# Patient Record
(demographics unavailable — no encounter records)

---

## 2025-03-21 NOTE — PHYSICAL EXAM
[Chaperone Present] : A chaperone was present in the examining room during all aspects of the physical examination [Appropriately responsive] : appropriately responsive [Alert] : alert [No Acute Distress] : no acute distress [No Lymphadenopathy] : no lymphadenopathy [Regular Rate Rhythm] : regular rate rhythm [No Murmurs] : no murmurs [Clear to Auscultation B/L] : clear to auscultation bilaterally [Soft] : soft [Non-tender] : non-tender [Non-distended] : non-distended [No HSM] : No HSM [No Lesions] : no lesions [No Mass] : no mass [Oriented x3] : oriented x3 [Examination Of The Breasts] : a normal appearance [No Masses] : no breast masses were palpable [Labia Majora] : normal [Labia Minora] : normal [Normal] : normal [Anteversion] : anteverted [Uterine Adnexae] : normal [FreeTextEntry2] : Isabella [FreeTextEntry6] : No masses, nontender, no skin changes, no nipple discharge, no adenopathy. [Tenderness] : nontender [Mass ___ cm] : no uterine mass was palpated

## 2025-03-21 NOTE — HISTORY OF PRESENT ILLNESS
[FreeTextEntry1] : Patient is a 24-year-old  0 last menstrual period 2025 Presents for initial visit, to establish GYN care, annual visit, complaining of some heavier and more painful periods Patient has ParaGard IUD in place since 2024

## 2025-03-21 NOTE — PLAN
[FreeTextEntry1] : Patient is a 24-year-old  0 last menstrual period 2025 Patient presents for initial visit, to establish GYN care, annual visit, complaining of some heavy and more painful periods Patient has ParaGard IUD in place since 2024 Physical exam reveals a well-developed well-nourished female no apparent distress,, BMI 29 Heart regular rhythm and rate, lungs clear, breast no mass nontender no skin change no nipple discharge no adenopathy, abdomen soft nontender no organomegaly Pelvic exam shows normal female external genitalia, vagina lesions, cervix appropriate size nontender, uterus anteverted normal size nontender, adnexa no mass nontender IUD string visible at the external cervical os Patient states she had an IUD performed with negative findings on 2025,, will forward the results Discussed at length that IUD may unfortunately cause increased bleeding with her cycle and also some increased pain but this should resolve in time Questions answered Patient states she understands Follow-up 1 year or prior to that as needed  Isabella was present as a chaperone for the entire assessment and examination of this patient  Past medical history,, history of migraines with auras and tachycardia PSHx,, patient denies Allergies,, Compass denies Medications,, Zyrtec, Norvasc Past OB history,, patient denies Past GYN history,,, medication 12, interval 28, duration 5 days, patient states she had been on birth controls approximately 5 to 6 years in the past and states she has an IUD in place the ParaGard since 2024 Tobacco use,,, vapes Alcoholic,,, social use Drug use,, occasional marijuana use Family history,, mother alive history of breast cancer at age 16,, hypertension,, father alive history of heart disease,, denies any other family history of breast or ovarian cancer

## 2025-04-07 NOTE — ASSESSMENT
[FreeTextEntry1] : 24-year-old woman with episodic chest pain, palpitations, and shortness of breath.

## 2025-04-07 NOTE — REASON FOR VISIT
[FreeTextEntry1] : 24-year-old woman with history of migraines, ADHD, insomnia comes in for evaluation of chest pains, shortness of breath, palpitations.

## 2025-04-07 NOTE — HISTORY OF PRESENT ILLNESS
[FreeTextEntry1] : 24-year-old woman presents for evaluation of chest pain, shortness of breath, and palpitations.  She states that she has been having palpitations for the last half weeks, however usually she gets them about once a month.  They feel like the heart is squeezing too hard as well as racing.  She also has episodes of chest pain and shortness of breath that come together with the palpitations.  The symptoms are unrelated to any exertion, position, or time of day.  They are not associated with food.  Patient does some exercises, but the symptoms may or may not occur at that time.  She denies any lightheadedness, dizziness, loss of consciousness, PND, or orthopnea.  She does have some significant family history of coronary artery disease on her paternal side and her paternal grandfather and paternal uncle.  There is no history of premature cardiac death or CAD in her immediate family.

## 2025-04-07 NOTE — CARDIOLOGY SUMMARY
[de-identified] : 04/07/2025 normal sinus rhythm with normal variant incomplete right bundle branch block.

## 2025-04-07 NOTE — DISCUSSION/SUMMARY
[EKG obtained to assist in diagnosis and management of assessed problem(s)] : EKG obtained to assist in diagnosis and management of assessed problem(s) [FreeTextEntry1] : 1.  Chest pain-given her family history on the paternal side would recommend getting a treadmill EKG stress test.  2.  Shortness of breath-will get a TTE for evaluation of LV function and valves.  3.  Palpitations-states happen about once a month but currently having ongoing palpitations.  Will check a 14-day Holter monitor.

## 2025-04-07 NOTE — CARDIOLOGY SUMMARY
[de-identified] : 04/07/2025 normal sinus rhythm with normal variant incomplete right bundle branch block.